# Patient Record
Sex: FEMALE
[De-identification: names, ages, dates, MRNs, and addresses within clinical notes are randomized per-mention and may not be internally consistent; named-entity substitution may affect disease eponyms.]

---

## 2023-08-14 ENCOUNTER — APPOINTMENT (OUTPATIENT)
Dept: OPHTHALMOLOGY | Facility: CLINIC | Age: 63
End: 2023-08-14

## 2025-04-07 NOTE — ASU PATIENT PROFILE, ADULT - NS PREOP UNDERSTANDS INFO
No solid food/dairy/candy/gum after 05:30am tomorrow; water allowed before 11:30am tomorrow; patient reminded to come with photo ID/insurance/credit card; dress in comfortable clothes; no jewelries/contact lens/valuable; no smoking/alcohol drinking/recreational drug use today; escort to have photo ID; address and callback number was given/yes

## 2025-04-07 NOTE — ASU PATIENT PROFILE, ADULT - FALL HARM RISK - RISK INTERVENTIONS
Assistance OOB with selected safe patient handling equipment/Assistance with ambulation/Communicate Fall Risk and Risk Factors to all staff, patient, and family/Discuss with provider need for PT consult/Monitor gait and stability/Reinforce activity limits and safety measures with patient and family/Visual Cue: Yellow wristband/Bed in lowest position, wheels locked, appropriate side rails in place/Call bell, personal items and telephone in reach/Instruct patient to call for assistance before getting out of bed or chair/Non-slip footwear when patient is out of bed/[place] to call system/Physically safe environment - no spills, clutter or unnecessary equipment/Purposeful Proactive Rounding/Room/bathroom lighting operational, light cord in reach

## 2025-04-08 ENCOUNTER — OUTPATIENT (OUTPATIENT)
Dept: OUTPATIENT SERVICES | Facility: HOSPITAL | Age: 65
LOS: 1 days | Discharge: HOME | End: 2025-04-08

## 2025-04-08 ENCOUNTER — TRANSCRIPTION ENCOUNTER (OUTPATIENT)
Age: 65
End: 2025-04-08

## 2025-04-08 VITALS
OXYGEN SATURATION: 98 % | DIASTOLIC BLOOD PRESSURE: 58 MMHG | TEMPERATURE: 98 F | SYSTOLIC BLOOD PRESSURE: 111 MMHG | RESPIRATION RATE: 16 BRPM | HEART RATE: 81 BPM

## 2025-04-08 VITALS
RESPIRATION RATE: 16 BRPM | OXYGEN SATURATION: 100 % | TEMPERATURE: 97 F | HEIGHT: 69 IN | DIASTOLIC BLOOD PRESSURE: 78 MMHG | SYSTOLIC BLOOD PRESSURE: 147 MMHG | WEIGHT: 166.67 LBS | HEART RATE: 81 BPM

## 2025-04-08 DIAGNOSIS — Z90.710 ACQUIRED ABSENCE OF BOTH CERVIX AND UTERUS: Chronic | ICD-10-CM

## 2025-04-08 DIAGNOSIS — Z98.49 CATARACT EXTRACTION STATUS, UNSPECIFIED EYE: Chronic | ICD-10-CM

## 2025-04-08 DEVICE — LASER PROBE 25G CONSTELLATION: Type: IMPLANTABLE DEVICE | Site: LEFT EYE | Status: FUNCTIONAL

## 2025-04-08 DEVICE — PROBE LASER ARTIC ILLUMINATED 10K 25G: Type: IMPLANTABLE DEVICE | Site: LEFT EYE | Status: FUNCTIONAL

## 2025-04-08 RX ORDER — ATROPINE SULFATE 1 %
1 OINTMENT (GRAM) OPHTHALMIC (EYE)
Refills: 0 | Status: COMPLETED | OUTPATIENT
Start: 2025-04-08 | End: 2025-04-08

## 2025-04-08 RX ORDER — ONDANSETRON HCL/PF 4 MG/2 ML
4 VIAL (ML) INJECTION ONCE
Refills: 0 | Status: DISCONTINUED | OUTPATIENT
Start: 2025-04-08 | End: 2025-04-08

## 2025-04-08 RX ORDER — BEVACIZUMAB-MALY 400 MG/16ML
0.05 INJECTION, SOLUTION INTRAVENOUS ONCE
Refills: 0 | Status: DISCONTINUED | OUTPATIENT
Start: 2025-04-08 | End: 2025-04-08

## 2025-04-08 RX ORDER — OFLOXACIN 3 MG/ML
1 SOLUTION OPHTHALMIC
Refills: 0 | Status: COMPLETED | OUTPATIENT
Start: 2025-04-08 | End: 2025-04-08

## 2025-04-08 RX ORDER — SODIUM CHLORIDE 9 G/1000ML
500 INJECTION, SOLUTION INTRAVENOUS
Refills: 0 | Status: DISCONTINUED | OUTPATIENT
Start: 2025-04-08 | End: 2025-04-08

## 2025-04-08 RX ORDER — TROPICAMIDE
1 POWDER (GRAM) MISCELLANEOUS
Refills: 0 | Status: COMPLETED | OUTPATIENT
Start: 2025-04-08 | End: 2025-04-08

## 2025-04-08 RX ORDER — TETRACAINE HYDROCHLORIDE 5 MG/ML
1 SOLUTION OPHTHALMIC ONCE
Refills: 0 | Status: COMPLETED | OUTPATIENT
Start: 2025-04-08 | End: 2025-04-08

## 2025-04-08 RX ORDER — ACETAMINOPHEN 500 MG/5ML
650 LIQUID (ML) ORAL ONCE
Refills: 0 | Status: DISCONTINUED | OUTPATIENT
Start: 2025-04-08 | End: 2025-04-08

## 2025-04-08 RX ORDER — PHENYLEPHRINE HYDROCHLORIDE 25 MG/ML
1 SOLUTION OPHTHALMIC
Refills: 0 | Status: COMPLETED | OUTPATIENT
Start: 2025-04-08 | End: 2025-04-08

## 2025-04-08 RX ORDER — CHLOROQUINE PHOSPHATE
1 POWDER (GRAM) MISCELLANEOUS
Refills: 0 | Status: COMPLETED | OUTPATIENT
Start: 2025-04-08 | End: 2025-04-08

## 2025-04-08 RX ORDER — KETOROLAC TROMETHAMINE 5 MG/ML
1 SOLUTION/ DROPS OPHTHALMIC
Refills: 0 | Status: COMPLETED | OUTPATIENT
Start: 2025-04-08 | End: 2025-04-08

## 2025-04-08 RX ORDER — SODIUM CHLORIDE 9 G/1000ML
1000 INJECTION, SOLUTION INTRAVENOUS
Refills: 0 | Status: DISCONTINUED | OUTPATIENT
Start: 2025-04-08 | End: 2025-04-08

## 2025-04-08 RX ADMIN — KETOROLAC TROMETHAMINE 1 DROP(S): 5 SOLUTION/ DROPS OPHTHALMIC at 13:52

## 2025-04-08 RX ADMIN — Medication 1 DROP(S): at 13:57

## 2025-04-08 RX ADMIN — OFLOXACIN 1 DROP(S): 3 SOLUTION OPHTHALMIC at 13:52

## 2025-04-08 RX ADMIN — TETRACAINE HYDROCHLORIDE 1 DROP(S): 5 SOLUTION OPHTHALMIC at 13:52

## 2025-04-08 RX ADMIN — Medication 1 DROP(S): at 13:52

## 2025-04-08 RX ADMIN — OFLOXACIN 1 DROP(S): 3 SOLUTION OPHTHALMIC at 14:02

## 2025-04-08 RX ADMIN — PHENYLEPHRINE HYDROCHLORIDE 1 DROP(S): 25 SOLUTION OPHTHALMIC at 13:52

## 2025-04-08 RX ADMIN — PHENYLEPHRINE HYDROCHLORIDE 1 DROP(S): 25 SOLUTION OPHTHALMIC at 14:02

## 2025-04-08 RX ADMIN — OFLOXACIN 1 DROP(S): 3 SOLUTION OPHTHALMIC at 13:57

## 2025-04-08 RX ADMIN — Medication 1 DROP(S): at 14:02

## 2025-04-08 RX ADMIN — KETOROLAC TROMETHAMINE 1 DROP(S): 5 SOLUTION/ DROPS OPHTHALMIC at 13:57

## 2025-04-08 RX ADMIN — PHENYLEPHRINE HYDROCHLORIDE 1 DROP(S): 25 SOLUTION OPHTHALMIC at 13:57

## 2025-04-08 RX ADMIN — KETOROLAC TROMETHAMINE 1 DROP(S): 5 SOLUTION/ DROPS OPHTHALMIC at 14:02

## 2025-04-08 NOTE — ASU PREOP CHECKLIST - AS BP NONINV METHOD
Problem: Discharge Planning  Goal: Discharge to home or other facility with appropriate resources  3/16/2025 0751 by Demetria Awad, RN  Outcome: Progressing  3/16/2025 0011 by Demetria Awad RN  Outcome: Progressing     Problem: Safety - Adult  Goal: Free from fall injury  3/16/2025 0751 by Demetria Awad, RN  Outcome: Progressing  3/16/2025 0011 by Demetria Awad RN  Outcome: Progressing     Problem: ABCDS Injury Assessment  Goal: Absence of physical injury  3/16/2025 0751 by Demetria Awad RN  Outcome: Progressing  3/16/2025 0011 by Demetria Awad RN  Outcome: Progressing     Problem: Skin/Tissue Integrity  Goal: Skin integrity remains intact  Description: 1.  Monitor for areas of redness and/or skin breakdown  2.  Assess vascular access sites hourly  3.  Every 4-6 hours minimum:  Change oxygen saturation probe site  4.  Every 4-6 hours:  If on nasal continuous positive airway pressure, respiratory therapy assess nares and determine need for appliance change or resting period  Outcome: Progressing     Problem: Pain  Goal: Verbalizes/displays adequate comfort level or baseline comfort level  Outcome: Progressing      electronic

## 2025-04-08 NOTE — PACU DISCHARGE NOTE - COMMENTS
The patient signed out AMA in spite of agreeing to an postoperative transfer to [facility] for glycemic control.  See progress note.

## 2025-04-08 NOTE — ASU PREOP CHECKLIST - 1.
FSBS 374 at 12:02 assymtammy, Ramya Nurse Manager aware.  Dr Stapleton aware and spoke to patient and daughter FSBS 374 at 12:02 did not take Januvia and Metformin for a week assymptomatic, Ramya Nurse Manager aware.  Dr Stapleton aware and spoke to patient and daughter

## 2025-04-08 NOTE — PRE-ANESTHESIA EVALUATION ADULT - NSANTHOSAYNRD_GEN_A_CORE
No. ESSIE screening performed.  STOP BANG Legend: 0-2 = LOW Risk; 3-4 = INTERMEDIATE Risk; 5-8 = HIGH Risk

## 2025-04-08 NOTE — PROGRESS NOTE ADULT - ASSESSMENT
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 The risks benefits of both generall and local anesthesia were discussed in detail and the plan is to go ahead with surgery with evalation and likely admission should labs be abnormal.

## 2025-04-08 NOTE — PROGRESS NOTE ADULT - SUBJECTIVE AND OBJECTIVE BOX
Pt is a 66yo F who presented today with hyperglycemia and poor diabetic control.  The chart was discussed yesterday when the glucose was over 450 with a hgbA1C of 15.  The decision was made to allow the patient to present today and check a glucose preop.  Calls were attempted yesterday and the preop nurse found the patient to be noncompliant with medications.  Upon admission, the FSBS was 374.  A discussion was held between Jonathan Barrett, Regulo and myself.   stated the procedure should not be delayed due to the patient having vision in only one eye and the urgency to preserve whatever sight is remaining in the other (operative) eye.  The decision was made to do the procedure under MAC/GETA anesthesia and admit the patient to [facility] postoperatively for diabetic management.  The patient and her daughter were insistent that the patient is being evaluated and managed by endocrinologists and they felt we were irrationally considering postponing her case based on two lab values.    The plan was discussed with the patient and her daughter.  The patient agreed to the plan to proceed with a transfer to [facility] postoperatively.      Dr. Espinoza administered insulin SQ and the glucose dropped below 300 and the procedure was performed uneventfully.    I called [facility] ICU consult to review the case as well as the urinalysis which was sent preop (small amount of ketones were detected as well as glucose).  They suggested obtaining an anion gap and then consulting the hospitalist as to where the patient should be admitted.    In the PACU, the patient refused transfer to [facility] for evaluation/admission.  I discussed with the patient and the daughter the conversation I had with the critical care team as well as my concerns that it was not safe (and in fact dangerous) that the patient not agree to the transfer.  I discussed that the patient could go into a coma as well as suffer death as a result of her condition if untreated.  I was very clear with the patient that myself (as well as [facility]) felt she should be transferred to [facility] and by signing the AMA letter, this was against medical advice and dangerous.    The daughter read the AMA form to the patient and directed her where to sign, but the daughter refused to sign the paper as well as allow me to document her name.  I read the form to the patient to ensure she was aware of the form she signed.  The patient also refused her last FSBS as ordered by Dr. Espinoza.  Dr. Castillo did enter a progress note stating the surgical necessity to proceed without delaying the case due to possibility of vision loss/blindness.

## 2025-04-08 NOTE — PRE-ANESTHESIA EVALUATION ADULT - NSANTHPMHFT_GEN_ALL_CORE
pt is not compliant  w DM meds. Med not says pt refuses to take DM meds  seizure is shaking of one arm, no LOC

## 2025-04-08 NOTE — BRIEF OPERATIVE NOTE - NSICDXBRIEFPROCEDURE_GEN_ALL_CORE_FT
PROCEDURES:  Repair of tractional retinal detachment with vitrectomy by pars plana approach and endolaser 08-Apr-2025 18:29:10  Sean Castillo  [place], eye, anterior chamber 08-Apr-2025 18:29:44  Sean Castillo

## 2025-04-08 NOTE — BRIEF OPERATIVE NOTE - NSICDXBRIEFPOSTOP_GEN_ALL_CORE_FT
POST-OP DIAGNOSIS:  Combined traction and rhegmatogenous retinal detachment 08-Apr-2025 18:32:49  Sean Castillo  Proliferative diabetic retinopathy 08-Apr-2025 18:32:31  Sean Castillo  Hyphema 08-Apr-2025 18:32:39  Sean Castillo

## 2025-04-08 NOTE — PRE-ANESTHESIA EVALUATION ADULT - NSANTHADDINFOFT_GEN_ALL_CORE
Pt says she is OK with MAC. Dr Castillo says this is a very urgent case  Case d/w Dr Stapleton, who d?w Dr Rajput. Plan will be MAC, and transfer to  post op

## 2025-04-08 NOTE — BRIEF OPERATIVE NOTE - NSICDXBRIEFPREOP_GEN_ALL_CORE_FT
7/27/18  pt had drainage of rt lung abcess, decortication of rt lung, rt thoracotomy, flexible bronchoscopy
Pt transferred from outside hospital with complaints of right sided chest pain and SOB
Pt with right pleural effusion s/p pigtail insertion and drainage
Pt. s/p right chest pigtail insertion. Pre-op for VATS tomorrow.
PRE-OP DIAGNOSIS:  Vitreous hemorrhage 08-Apr-2025 18:31:19  Sean Castillo  Proliferative diabetic retinopathy 08-Apr-2025 18:32:21  Sean Castillo  Hyphema 08-Apr-2025 18:31:32  Sean Castillo  Combined traction and rhegmatogenous retinal detachment 08-Apr-2025 18:30:11  Sean Castillo

## (undated) DEVICE — DRAPE MICROSCOPE KNOB COVER SMALL (2 PCS)

## (undated) DEVICE — ELCTR WETFIELD ERASER FINE TIP 25GA

## (undated) DEVICE — GLV 7.5 PROTEXIS (WHITE)

## (undated) DEVICE — CANNULA ALCON VALVED 25G 6MM

## (undated) DEVICE — Device

## (undated) DEVICE — TIP BACKFLUSH SOFT DISP 23GA

## (undated) DEVICE — PACK VITRECTOMY BEVEL 25G PLUS 10K

## (undated) DEVICE — SCRPR MEM DMD DUSTED 25G

## (undated) DEVICE — SYR FILTER 22 MICRONS

## (undated) DEVICE — CANNULA .6MM 23G

## (undated) DEVICE — INSTR GRIESHABER REVOLUTION SCISSOR 25G (CURVED) DISP

## (undated) DEVICE — CANNULA MEDONE DUAL BORE SIDEFLO 25G

## (undated) DEVICE — PICK MICRO .6MM 23G

## (undated) DEVICE — Q TIP 6" WOOD STEM

## (undated) DEVICE — NDL RETROBULBAR VISITEC 25X1.5

## (undated) DEVICE — RETRACTOR SYNERGETICS IRIS FLEXIBLE DISP

## (undated) DEVICE — ILM FORCEP 25G PLUS

## (undated) DEVICE — SUT VICRYL 8-0 12" TG140-8 DA

## (undated) DEVICE — PACK VITRECTOMY  LF

## (undated) DEVICE — TANK GAS INTRAOCULAR ISPAN C3F8 125 GM

## (undated) DEVICE — MILLEX HA 45UM

## (undated) DEVICE — FLEXLOOP CURVED SCRAPER MEMBRANE 25G

## (undated) DEVICE — FORCEP END GRASPING 25G PLUS DISP

## (undated) DEVICE — SYR SLIP LOCK 1CC

## (undated) DEVICE — SOL IRR BAL SALT + 500ML

## (undated) DEVICE — SYR LUER LOK 10CC